# Patient Record
Sex: FEMALE | Race: BLACK OR AFRICAN AMERICAN | NOT HISPANIC OR LATINO | URBAN - METROPOLITAN AREA
[De-identification: names, ages, dates, MRNs, and addresses within clinical notes are randomized per-mention and may not be internally consistent; named-entity substitution may affect disease eponyms.]

---

## 2022-06-16 ENCOUNTER — WEB ENCOUNTER (OUTPATIENT)
Dept: URBAN - METROPOLITAN AREA CLINIC 70 | Facility: CLINIC | Age: 57
End: 2022-06-16

## 2022-06-17 ENCOUNTER — CLAIMS CREATED FROM THE CLAIM WINDOW (OUTPATIENT)
Dept: URBAN - METROPOLITAN AREA CLINIC 70 | Facility: CLINIC | Age: 57
End: 2022-06-17
Payer: COMMERCIAL

## 2022-06-17 ENCOUNTER — LAB OUTSIDE AN ENCOUNTER (OUTPATIENT)
Dept: URBAN - METROPOLITAN AREA CLINIC 70 | Facility: CLINIC | Age: 57
End: 2022-06-17

## 2022-06-17 ENCOUNTER — WEB ENCOUNTER (OUTPATIENT)
Dept: URBAN - METROPOLITAN AREA CLINIC 70 | Facility: CLINIC | Age: 57
End: 2022-06-17

## 2022-06-17 VITALS
HEART RATE: 86 BPM | BODY MASS INDEX: 32.96 KG/M2 | SYSTOLIC BLOOD PRESSURE: 140 MMHG | WEIGHT: 186 LBS | DIASTOLIC BLOOD PRESSURE: 83 MMHG | TEMPERATURE: 97.7 F | HEIGHT: 63 IN

## 2022-06-17 DIAGNOSIS — R10.13 EPIGASTRIC ABDOMINAL PAIN: ICD-10-CM

## 2022-06-17 DIAGNOSIS — R10.13 DYSPEPSIA: ICD-10-CM

## 2022-06-17 DIAGNOSIS — R11.0 NAUSEA: ICD-10-CM

## 2022-06-17 PROCEDURE — 99204 OFFICE O/P NEW MOD 45 MIN: CPT

## 2022-06-17 RX ORDER — PANTOPRAZOLE SODIUM 40 MG/1
TAKE 1 TABLET BY MOUTH EVERY DAY TABLET, DELAYED RELEASE ORAL
Qty: 30 EACH | Refills: 1 | Status: ACTIVE | COMMUNITY

## 2022-06-17 RX ORDER — CARVEDILOL 25 MG/1
TABLET, FILM COATED ORAL
Qty: 180 TABLET | Status: ACTIVE | COMMUNITY

## 2022-06-17 RX ORDER — PANTOPRAZOLE SODIUM 40 MG/1
TAKE 1 TABLET BY MOUTH EVERY DAY TABLET, DELAYED RELEASE ORAL
OUTPATIENT

## 2022-06-17 NOTE — HPI-TODAY'S VISIT:
Pt presents for epigastric abdominal pain and nausea. She describes it as a burning sensation and nausea following meals.  She is currently taking Pantoprazole 40mg in the evenings with no improvement in symptoms.  She denies dysphagia, melena. or hematemesis.  She is s/p cholecystectomy. She had a colonoscopy 3-4 years ago showing polyps and recall for 5 years per the pt.  No fhx of colon cancer.

## 2022-07-06 ENCOUNTER — TELEPHONE ENCOUNTER (OUTPATIENT)
Dept: URBAN - METROPOLITAN AREA CLINIC 92 | Facility: CLINIC | Age: 57
End: 2022-07-06

## 2022-07-06 ENCOUNTER — OFFICE VISIT (OUTPATIENT)
Dept: URBAN - METROPOLITAN AREA SURGERY CENTER 24 | Facility: SURGERY CENTER | Age: 57
End: 2022-07-06
Payer: COMMERCIAL

## 2022-07-06 DIAGNOSIS — K29.60 ADENOPAPILLOMATOSIS GASTRICA: ICD-10-CM

## 2022-07-06 DIAGNOSIS — K29.80 ACUTE DUODENITIS: ICD-10-CM

## 2022-07-06 PROCEDURE — 43239 EGD BIOPSY SINGLE/MULTIPLE: CPT | Performed by: INTERNAL MEDICINE

## 2022-07-06 PROCEDURE — G8907 PT DOC NO EVENTS ON DISCHARG: HCPCS | Performed by: INTERNAL MEDICINE

## 2022-07-06 RX ORDER — CARVEDILOL 25 MG/1
TABLET, FILM COATED ORAL
Qty: 180 TABLET | Status: ACTIVE | COMMUNITY

## 2022-07-06 RX ORDER — SUCRALFATE 1 G
1 TABLET ON AN EMPTY STOMACH TABLET ORAL TWICE A DAY
Qty: 60 | Refills: 1 | OUTPATIENT
Start: 2022-07-06 | End: 2022-09-04

## 2022-07-06 RX ORDER — PANTOPRAZOLE SODIUM 40 MG/1
TAKE 1 TABLET BY MOUTH EVERY DAY TABLET, DELAYED RELEASE ORAL
Status: ACTIVE | COMMUNITY

## 2022-08-10 ENCOUNTER — OFFICE VISIT (OUTPATIENT)
Dept: URBAN - METROPOLITAN AREA CLINIC 70 | Facility: CLINIC | Age: 57
End: 2022-08-10
Payer: COMMERCIAL

## 2022-08-10 VITALS
BODY MASS INDEX: 32.74 KG/M2 | HEIGHT: 63 IN | TEMPERATURE: 97.7 F | WEIGHT: 184.8 LBS | DIASTOLIC BLOOD PRESSURE: 84 MMHG | SYSTOLIC BLOOD PRESSURE: 130 MMHG | HEART RATE: 78 BPM

## 2022-08-10 DIAGNOSIS — K21.9 GASTROESOPHAGEAL REFLUX DISEASE WITHOUT ESOPHAGITIS: ICD-10-CM

## 2022-08-10 PROBLEM — 266435005: Status: ACTIVE | Noted: 2022-08-10

## 2022-08-10 PROCEDURE — 99214 OFFICE O/P EST MOD 30 MIN: CPT

## 2022-08-10 RX ORDER — PANTOPRAZOLE SODIUM 40 MG/1
TAKE 1 TABLET BY MOUTH EVERY DAY TABLET, DELAYED RELEASE ORAL
Status: ACTIVE | COMMUNITY

## 2022-08-10 RX ORDER — PANTOPRAZOLE SODIUM 40 MG/1
TAKE 1 TABLET IN THE MORNING ON AN EMPTY STOMACH, WAIT 30 MINUTES, THEN START EATING TABLET, DELAYED RELEASE ORAL ONCE A DAY
Qty: 90 | Refills: 3 | OUTPATIENT

## 2022-08-10 RX ORDER — CARVEDILOL 25 MG/1
TABLET, FILM COATED ORAL
Qty: 180 TABLET | Status: ACTIVE | COMMUNITY

## 2022-08-10 RX ORDER — SUCRALFATE 1 G
1 TABLET ON AN EMPTY STOMACH TABLET ORAL TWICE A DAY
Qty: 60 | Refills: 1 | Status: ACTIVE | COMMUNITY
Start: 2022-07-06 | End: 2022-09-04

## 2022-08-10 NOTE — HPI-OTHER HISTORIES
OV 6/17/2022: Pt presents for epigastric abdominal pain and nausea. She describes it as a burning sensation and nausea following meals.  She is currently taking Pantoprazole 40mg in the evenings with no improvement in symptoms.  She denies dysphagia, melena. or hematemesis.  She is s/p cholecystectomy. She had a colonoscopy 3-4 years ago showing polyps and recall for 5 years per the pt.  No fhx of colon cancer.

## 2022-08-10 NOTE — HPI-TODAY'S VISIT:
The pt presents for a procedure f/u. EGD 7/6/2022 showed a hiatal hernia, gastritis, and congested duodenal mucosa. Today she states that she is feeling well.  She states that since her procedure she has been taking Carafate and was unaware that she should also continue to take Pantoprazole 40mg daily.  She states that she was recent diagnosed with prediabetes and has been making adjustments in her diet and weight loss to manage this.

## 2023-11-14 ENCOUNTER — LAB OUTSIDE AN ENCOUNTER (OUTPATIENT)
Dept: URBAN - METROPOLITAN AREA CLINIC 70 | Facility: CLINIC | Age: 58
End: 2023-11-14

## 2023-11-14 ENCOUNTER — OFFICE VISIT (OUTPATIENT)
Dept: URBAN - METROPOLITAN AREA CLINIC 70 | Facility: CLINIC | Age: 58
End: 2023-11-14
Payer: COMMERCIAL

## 2023-11-14 VITALS
HEART RATE: 84 BPM | SYSTOLIC BLOOD PRESSURE: 151 MMHG | DIASTOLIC BLOOD PRESSURE: 89 MMHG | WEIGHT: 184.4 LBS | TEMPERATURE: 97.5 F | BODY MASS INDEX: 33.93 KG/M2 | HEIGHT: 62 IN

## 2023-11-14 DIAGNOSIS — R10.13 EPIGASTRIC PAIN: ICD-10-CM

## 2023-11-14 DIAGNOSIS — R11.0 NAUSEA: ICD-10-CM

## 2023-11-14 DIAGNOSIS — Z12.11 COLON CANCER SCREENING: ICD-10-CM

## 2023-11-14 DIAGNOSIS — K21.9 GASTROESOPHAGEAL REFLUX DISEASE WITHOUT ESOPHAGITIS: ICD-10-CM

## 2023-11-14 PROCEDURE — 99214 OFFICE O/P EST MOD 30 MIN: CPT | Performed by: NURSE PRACTITIONER

## 2023-11-14 RX ORDER — ONDANSETRON HYDROCHLORIDE 4 MG/1
TABLET, FILM COATED ORAL
Qty: 60 TABLET | Status: ACTIVE | COMMUNITY

## 2023-11-14 RX ORDER — CARVEDILOL 25 MG/1
TABLET, FILM COATED ORAL
Qty: 180 TABLET | Status: ACTIVE | COMMUNITY

## 2023-11-14 RX ORDER — AMLODIPINE BESYLATE 5 MG/1
TABLET ORAL
Qty: 30 TABLET | Status: ACTIVE | COMMUNITY

## 2023-11-14 RX ORDER — PANTOPRAZOLE SODIUM 40 MG/1
TAKE 1 TABLET IN THE MORNING ON AN EMPTY STOMACH, WAIT 30 MINUTES, THEN START EATING TABLET, DELAYED RELEASE ORAL ONCE A DAY
Qty: 90 | Refills: 3 | OUTPATIENT

## 2023-11-14 RX ORDER — PANTOPRAZOLE SODIUM 40 MG/1
TAKE 1 TABLET BY MOUTH DAILY TABLET, DELAYED RELEASE ORAL
Qty: 30 EACH | Refills: 0 | Status: ACTIVE | COMMUNITY

## 2023-11-14 RX ORDER — CARVEDILOL 25 MG/1
TABLET, FILM COATED ORAL
Qty: 180 TABLET | Status: ON HOLD | COMMUNITY

## 2023-11-14 NOTE — HPI-TODAY'S VISIT:
OV 6/17/2022 Samaria Kelly PA: Pt presents for epigastric abdominal pain and nausea. She describes it as a burning sensation and nausea following meals. She is currently taking Pantoprazole 40mg in the evenings with no improvement in symptoms. She denies dysphagia, melena. or hematemesis. She is s/p cholecystectomy. She had a colonoscopy 3-4 years ago showing polyps and recall for 5 years per the pt. No fhx of colon cancer. ------------------------ OV 8/10/22 Samaria Kelly PA: The pt presents for a procedure f/u. EGD 7/6/2022 showed a hiatal hernia, gastritis, and congested duodenal mucosa. Today she states that she is feeling well.  She states that since her procedure she has been taking Carafate and was unaware that she should also continue to take Pantoprazole 40mg daily.  She states that she was recent diagnosed with prediabetes and has been making adjustments in her diet and weight loss to manage this. ------------------------------ Today 11/14/23: Patient presents today for recurrent UGI symptoms with heartburn, epigastric pain and nausea. Symptom have been intermittent for years. s/p CCY. Last EGD last year showing small hiatal hernia, gastritis (bx neg H pylori) and duodenitis. Rare NSAID use. No alcohol use. When asked how she is taking her PPI she admits to taking medication as needed. Discussed need of daily compliance of medication to control GERD symptoms. Denies fever, wt loss, vomiting, dysphagia, constipation, diarrhea, or signs of GI bleeding. Last colonoscopy 2018 by Dr. Nava with negative results with recall in 10 years per pt. No Fhx of colon cancer.

## 2023-11-15 ENCOUNTER — TELEPHONE ENCOUNTER (OUTPATIENT)
Dept: URBAN - METROPOLITAN AREA CLINIC 70 | Facility: CLINIC | Age: 58
End: 2023-11-15

## 2023-11-30 ENCOUNTER — TELEPHONE ENCOUNTER (OUTPATIENT)
Dept: URBAN - METROPOLITAN AREA CLINIC 70 | Facility: CLINIC | Age: 58
End: 2023-11-30

## 2024-01-09 ENCOUNTER — OFFICE VISIT (OUTPATIENT)
Dept: URBAN - METROPOLITAN AREA CLINIC 70 | Facility: CLINIC | Age: 59
End: 2024-01-09

## 2024-03-07 ENCOUNTER — OV EP (OUTPATIENT)
Dept: URBAN - METROPOLITAN AREA CLINIC 118 | Facility: CLINIC | Age: 59
End: 2024-03-07
Payer: COMMERCIAL

## 2024-03-07 VITALS
WEIGHT: 182.6 LBS | HEART RATE: 77 BPM | TEMPERATURE: 97.7 F | BODY MASS INDEX: 33.6 KG/M2 | SYSTOLIC BLOOD PRESSURE: 142 MMHG | HEIGHT: 62 IN | DIASTOLIC BLOOD PRESSURE: 75 MMHG

## 2024-03-07 DIAGNOSIS — R10.13 EPIGASTRIC PAIN: ICD-10-CM

## 2024-03-07 DIAGNOSIS — Z12.11 COLON CANCER SCREENING: ICD-10-CM

## 2024-03-07 DIAGNOSIS — K76.0 FATTY LIVER: ICD-10-CM

## 2024-03-07 DIAGNOSIS — R11.0 NAUSEA: ICD-10-CM

## 2024-03-07 PROBLEM — 197321007: Status: ACTIVE | Noted: 2024-03-07

## 2024-03-07 PROCEDURE — 99214 OFFICE O/P EST MOD 30 MIN: CPT | Performed by: INTERNAL MEDICINE

## 2024-03-07 RX ORDER — PANTOPRAZOLE SODIUM 40 MG/1
TAKE 1 TABLET BY MOUTH DAILY TABLET, DELAYED RELEASE ORAL
Qty: 30 EACH | Refills: 0 | Status: ACTIVE | COMMUNITY

## 2024-03-07 RX ORDER — ONDANSETRON HYDROCHLORIDE 4 MG/1
TABLET, FILM COATED ORAL
Qty: 60 TABLET | Status: ACTIVE | COMMUNITY

## 2024-03-07 RX ORDER — PANTOPRAZOLE SODIUM 40 MG/1
1 TABLET TABLET, DELAYED RELEASE ORAL
Qty: 60 | Refills: 5 | OUTPATIENT
Start: 2024-03-07

## 2024-03-07 RX ORDER — SUCRALFATE 1 G/1
1 TABLET ON AN EMPTY STOMACH TABLET ORAL
Qty: 90 | Refills: 1 | OUTPATIENT
Start: 2024-03-07 | End: 2024-05-06

## 2024-03-07 RX ORDER — CARVEDILOL 25 MG/1
TABLET, FILM COATED ORAL
Qty: 180 TABLET | Status: ACTIVE | COMMUNITY

## 2024-03-07 RX ORDER — AMLODIPINE BESYLATE 5 MG/1
TABLET ORAL
Qty: 30 TABLET | Status: ACTIVE | COMMUNITY

## 2024-03-07 RX ORDER — FAMOTIDINE 40 MG/1
1 TABLET AT BEDTIME TABLET, FILM COATED ORAL ONCE A DAY
Status: ACTIVE | COMMUNITY

## 2024-03-07 NOTE — PHYSICAL EXAM GASTROINTESTINAL
Abdomen: soft and nondistended, no visible masses, epigastric tenderness to palpation, no guarding or rigidity, no rebound tenderness, no palpable masses, normal bowel sounds. Liver and Spleen: no hepatosplenomegaly, liver nontender. Rectal: Deferred

## 2024-03-07 NOTE — HPI-TODAY'S VISIT:
3/7/24: Pt presents today for f/u. CT scan was ordered at last visit d/t persistent epigastric pain & nausea. Pt did not have completed. Was supposed to have it with [facility] but she cancelled that appt and has future order at [facility]. Notes her insurance recently changed and they said wouldn't cover it unless had EGD. Pt did not inform them she had EGD done 2022. No improvement since taking Pantoprazole 40 mg daily, no longer taking prn. Also started taking Famotidine 40 mg QHS rx'd by PCP for the last 3 weeks without improvement. No worsening. No dysphagia / odynophagia, emesis, early satiety, signs of GI bleeding, or unintentional weight loss. No LGI sxs.   Upon record review last CT abd-pelvis with IV contrast was 2019 only notable for slightly fatty liver, stable hypodense masses in the liver, tiny umbilical fat-containing hernia, colon diverticulosis.  --------------------------------- 11/14/23 JACOB GutierrezC: Patient presents today for recurrent UGI symptoms with heartburn, epigastric pain and nausea. Symptom have been intermittent for years. s/p CCY. Last EGD last year showing small hiatal hernia, gastritis (bx neg H pylori) and duodenitis. Rare NSAID use. No alcohol use. When asked how she is taking her PPI she admits to taking medication as needed. Discussed need of daily compliance of medication to control GERD symptoms. Denies fever, wt loss, vomiting, dysphagia, constipation, diarrhea, or signs of GI bleeding. Last colonoscopy 2018 by Dr. Nava with negative results with recall in 10 years per pt. No Fhx of colon cancer.  --------------------------------- OV 8/10/22 Samaria DANIELLE: The pt presents for a procedure f/u. EGD 7/6/2022 showed a hiatal hernia, gastritis, and congested duodenal mucosa. Today she states that she is feeling well. She states that since her procedure she has been taking Carafate and was unaware that she should also continue to take Pantoprazole 40mg daily.She states that she was recent diagnosed with prediabetes and has been making adjustments in her diet and weight loss to manage this. --------------------------------- OV 6/17/2022 Samaria Kelly PA: Pt presents for epigastric abdominal pain and nausea. She describes it as a burning sensation and nausea following meals. She is currently taking Pantoprazole 40mg in the evenings with no improvement in symptoms. She denies dysphagia, melena. or hematemesis. She is s/p cholecystectomy.She had a colonoscopy 3-4 years ago showing polyps and recall for 5 years per the pt. No fhx of colon cancer.

## 2024-03-08 LAB
ABSOLUTE BASOPHILS: 30
ABSOLUTE EOSINOPHILS: 112
ABSOLUTE LYMPHOCYTES: 1829
ABSOLUTE MONOCYTES: 531
ABSOLUTE NEUTROPHILS: 3398
ALBUMIN/GLOBULIN RATIO: 1.6
ALBUMIN: 4.6
ALKALINE PHOSPHATASE: 67
ALT (SGPT): 21
AST (SGOT): 15
BASOPHILS: 0.5
BILIRUBIN, DIRECT: 0.1
BILIRUBIN, INDIRECT: 0.5
BILIRUBIN, TOTAL: 0.6
BUN/CREATININE RATIO: (no result)
C-REACTIVE PROTEIN, QUANT: 2.5
CALCIUM: 9.7
CARBON DIOXIDE: 25
CHLORIDE: 105
CREATININE: 0.81
EGFR: 83
EOSINOPHILS: 1.9
GLOBULIN: 2.9
GLUCOSE: 114
HEMATOCRIT: 45.5
HEMOGLOBIN: 15.4
LYMPHOCYTES: 31
MCH: 28.8
MCHC: 33.8
MCV: 85.2
MONOCYTES: 9
MPV: 13.1
NEUTROPHILS: 57.6
PLATELET COUNT: 207
POTASSIUM: 4.4
PROTEIN, TOTAL: 7.5
RDW: 13.4
RED BLOOD CELL COUNT: 5.34
SODIUM: 142
UREA NITROGEN (BUN): 12
WHITE BLOOD CELL COUNT: 5.9

## 2024-03-21 ENCOUNTER — LAB (OUTPATIENT)
Dept: URBAN - METROPOLITAN AREA CLINIC 109 | Facility: CLINIC | Age: 59
End: 2024-03-21

## 2024-04-01 ENCOUNTER — EGD (OUTPATIENT)
Dept: URBAN - METROPOLITAN AREA SURGERY CENTER 23 | Facility: SURGERY CENTER | Age: 59
End: 2024-04-01
Payer: COMMERCIAL

## 2024-04-01 DIAGNOSIS — R10.13 ABDOMINAL DISCOMFORT, EPIGASTRIC: ICD-10-CM

## 2024-04-01 PROCEDURE — 43235 EGD DIAGNOSTIC BRUSH WASH: CPT | Performed by: INTERNAL MEDICINE

## 2024-04-01 RX ORDER — CARVEDILOL 25 MG/1
TABLET, FILM COATED ORAL
Qty: 180 TABLET | Status: ACTIVE | COMMUNITY

## 2024-04-01 RX ORDER — PANTOPRAZOLE SODIUM 40 MG/1
TAKE 1 TABLET BY MOUTH DAILY TABLET, DELAYED RELEASE ORAL
Qty: 30 EACH | Refills: 0 | Status: ACTIVE | COMMUNITY

## 2024-04-01 RX ORDER — FAMOTIDINE 40 MG/1
1 TABLET AT BEDTIME TABLET, FILM COATED ORAL ONCE A DAY
Status: ACTIVE | COMMUNITY

## 2024-04-01 RX ORDER — ONDANSETRON HYDROCHLORIDE 4 MG/1
TABLET, FILM COATED ORAL
Qty: 60 TABLET | Status: ACTIVE | COMMUNITY

## 2024-04-01 RX ORDER — AMLODIPINE BESYLATE 5 MG/1
TABLET ORAL
Qty: 30 TABLET | Status: ACTIVE | COMMUNITY

## 2024-04-01 RX ORDER — SUCRALFATE 1 G/1
1 TABLET ON AN EMPTY STOMACH TABLET ORAL
Qty: 90 | Refills: 1 | Status: ACTIVE | COMMUNITY
Start: 2024-03-07 | End: 2024-05-06

## 2024-04-01 RX ORDER — PANTOPRAZOLE SODIUM 40 MG/1
1 TABLET TABLET, DELAYED RELEASE ORAL
Qty: 60 | Refills: 5 | Status: ACTIVE | COMMUNITY
Start: 2024-03-07

## 2024-04-04 ENCOUNTER — OV CON (OUTPATIENT)
Dept: URBAN - METROPOLITAN AREA CLINIC 118 | Facility: CLINIC | Age: 59
End: 2024-04-04